# Patient Record
Sex: MALE | Race: WHITE
[De-identification: names, ages, dates, MRNs, and addresses within clinical notes are randomized per-mention and may not be internally consistent; named-entity substitution may affect disease eponyms.]

---

## 2019-02-01 ENCOUNTER — HOSPITAL ENCOUNTER (EMERGENCY)
Dept: HOSPITAL 54 - ER | Age: 69
Discharge: HOME | End: 2019-02-01
Payer: MEDICARE

## 2019-02-01 VITALS — BODY MASS INDEX: 27.74 KG/M2 | HEIGHT: 68 IN | WEIGHT: 183 LBS

## 2019-02-01 VITALS — SYSTOLIC BLOOD PRESSURE: 110 MMHG | DIASTOLIC BLOOD PRESSURE: 78 MMHG

## 2019-02-01 DIAGNOSIS — Z79.82: ICD-10-CM

## 2019-02-01 DIAGNOSIS — Z98.890: ICD-10-CM
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
## 2019-02-01 DIAGNOSIS — I25.10: ICD-10-CM

## 2019-02-01 DIAGNOSIS — Z85.46: ICD-10-CM

## 2019-02-01 DIAGNOSIS — F39: ICD-10-CM
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
## 2019-02-01 DIAGNOSIS — S22.32XA: Primary | ICD-10-CM

## 2019-02-01 PROCEDURE — 71100 X-RAY EXAM RIBS UNI 2 VIEWS: CPT

## 2019-02-01 PROCEDURE — 99283 EMERGENCY DEPT VISIT LOW MDM: CPT

## 2019-02-01 NOTE — NUR
RIB PAIN X 1WEEK; GLF X1 WEEK AGO.  STATES PAIN LEVEL 5/10, PAIN "FEELS LIKE A 
BRUISE".  PT IS AOX4, AMB, VSS, RR EVEN AND UNLABORED.  SKIN INTACT.  NO ACUTE 
DISTRESS NOTED.  DENIES SOB, DIZZINESS, WEAKNESS, N/V.  READY FOR EVAL.

## 2019-08-12 ENCOUNTER — HOSPITAL ENCOUNTER (EMERGENCY)
Dept: HOSPITAL 54 - ER | Age: 69
Discharge: HOME | End: 2019-08-12
Payer: MEDICARE

## 2019-08-12 VITALS — BODY MASS INDEX: 28.04 KG/M2 | WEIGHT: 185 LBS | HEIGHT: 68 IN

## 2019-08-12 VITALS — DIASTOLIC BLOOD PRESSURE: 68 MMHG | SYSTOLIC BLOOD PRESSURE: 128 MMHG

## 2019-08-12 DIAGNOSIS — Z85.46: ICD-10-CM

## 2019-08-12 DIAGNOSIS — Z98.890: ICD-10-CM

## 2019-08-12 DIAGNOSIS — G89.29: ICD-10-CM

## 2019-08-12 DIAGNOSIS — Z95.5: ICD-10-CM

## 2019-08-12 DIAGNOSIS — R42: ICD-10-CM

## 2019-08-12 DIAGNOSIS — F32.9: ICD-10-CM

## 2019-08-12 DIAGNOSIS — I25.2: ICD-10-CM

## 2019-08-12 DIAGNOSIS — I25.10: ICD-10-CM

## 2019-08-12 DIAGNOSIS — K52.9: Primary | ICD-10-CM

## 2019-08-12 DIAGNOSIS — M54.9: ICD-10-CM

## 2019-08-12 DIAGNOSIS — Z79.82: ICD-10-CM

## 2019-08-12 LAB
ALBUMIN SERPL BCP-MCNC: 3.6 G/DL (ref 3.4–5)
ALP SERPL-CCNC: 59 U/L (ref 46–116)
ALT SERPL W P-5'-P-CCNC: 29 U/L (ref 12–78)
AST SERPL W P-5'-P-CCNC: 16 U/L (ref 15–37)
BASOPHILS # BLD AUTO: 0 /CMM (ref 0–0.2)
BASOPHILS NFR BLD AUTO: 0.1 % (ref 0–2)
BILIRUB DIRECT SERPL-MCNC: 0.1 MG/DL (ref 0–0.2)
BILIRUB SERPL-MCNC: 0.6 MG/DL (ref 0.2–1)
BUN SERPL-MCNC: 34 MG/DL (ref 7–18)
CALCIUM SERPL-MCNC: 8.8 MG/DL (ref 8.5–10.1)
CHLORIDE SERPL-SCNC: 104 MMOL/L (ref 98–107)
CO2 SERPL-SCNC: 28 MMOL/L (ref 21–32)
CREAT SERPL-MCNC: 1.3 MG/DL (ref 0.6–1.3)
EOSINOPHIL NFR BLD AUTO: 1 % (ref 0–6)
GLUCOSE SERPL-MCNC: 119 MG/DL (ref 74–106)
HCT VFR BLD AUTO: 49 % (ref 39–51)
HGB BLD-MCNC: 16.4 G/DL (ref 13.5–17.5)
LIPASE SERPL-CCNC: 67 U/L (ref 73–393)
LYMPHOCYTES NFR BLD AUTO: 0.3 /CMM (ref 0.8–4.8)
LYMPHOCYTES NFR BLD AUTO: 5.3 % (ref 20–44)
MCHC RBC AUTO-ENTMCNC: 34 G/DL (ref 31–36)
MCV RBC AUTO: 94 FL (ref 80–96)
MONOCYTES NFR BLD AUTO: 0.2 /CMM (ref 0.1–1.3)
MONOCYTES NFR BLD AUTO: 4.1 % (ref 2–12)
NEUTROPHILS # BLD AUTO: 4.4 /CMM (ref 1.8–8.9)
NEUTROPHILS NFR BLD AUTO: 89.5 % (ref 43–81)
PH UR STRIP: 7.5 [PH] (ref 5–8)
PLATELET # BLD AUTO: 160 /CMM (ref 150–450)
POTASSIUM SERPL-SCNC: 4.1 MMOL/L (ref 3.5–5.1)
PROT SERPL-MCNC: 6.9 G/DL (ref 6.4–8.2)
RBC # BLD AUTO: 5.18 MIL/UL (ref 4.5–6)
RBC #/AREA URNS HPF: (no result) /HPF (ref 0–2)
SODIUM SERPL-SCNC: 142 MMOL/L (ref 136–145)
UROBILINOGEN UR STRIP-MCNC: 1 EU/DL
WBC #/AREA URNS HPF: (no result) /HPF (ref 0–3)
WBC NRBC COR # BLD AUTO: 4.9 K/UL (ref 4.3–11)

## 2019-08-12 PROCEDURE — 83605 ASSAY OF LACTIC ACID: CPT

## 2019-08-12 PROCEDURE — 96365 THER/PROPH/DIAG IV INF INIT: CPT

## 2019-08-12 PROCEDURE — 36415 COLL VENOUS BLD VENIPUNCTURE: CPT

## 2019-08-12 PROCEDURE — 80076 HEPATIC FUNCTION PANEL: CPT

## 2019-08-12 PROCEDURE — 85025 COMPLETE CBC W/AUTO DIFF WBC: CPT

## 2019-08-12 PROCEDURE — 96375 TX/PRO/DX INJ NEW DRUG ADDON: CPT

## 2019-08-12 PROCEDURE — 87040 BLOOD CULTURE FOR BACTERIA: CPT

## 2019-08-12 PROCEDURE — 81001 URINALYSIS AUTO W/SCOPE: CPT

## 2019-08-12 PROCEDURE — 99283 EMERGENCY DEPT VISIT LOW MDM: CPT

## 2019-08-12 PROCEDURE — 80048 BASIC METABOLIC PNL TOTAL CA: CPT

## 2019-08-12 PROCEDURE — 83690 ASSAY OF LIPASE: CPT

## 2019-08-12 PROCEDURE — 87086 URINE CULTURE/COLONY COUNT: CPT

## 2019-11-13 ENCOUNTER — HOSPITAL ENCOUNTER (EMERGENCY)
Dept: HOSPITAL 54 - ER | Age: 69
Discharge: HOME | End: 2019-11-13
Payer: MEDICARE

## 2019-11-13 VITALS — SYSTOLIC BLOOD PRESSURE: 125 MMHG | DIASTOLIC BLOOD PRESSURE: 79 MMHG

## 2019-11-13 VITALS — BODY MASS INDEX: 27.28 KG/M2 | WEIGHT: 180 LBS | HEIGHT: 68 IN

## 2019-11-13 DIAGNOSIS — I48.91: ICD-10-CM

## 2019-11-13 DIAGNOSIS — I25.10: ICD-10-CM

## 2019-11-13 DIAGNOSIS — Z79.82: ICD-10-CM

## 2019-11-13 DIAGNOSIS — G89.29: ICD-10-CM

## 2019-11-13 DIAGNOSIS — I25.2: ICD-10-CM

## 2019-11-13 DIAGNOSIS — F32.9: ICD-10-CM

## 2019-11-13 DIAGNOSIS — R07.81: ICD-10-CM

## 2019-11-13 DIAGNOSIS — Z98.890: ICD-10-CM

## 2019-11-13 DIAGNOSIS — M54.6: Primary | ICD-10-CM

## 2019-11-13 DIAGNOSIS — Z79.899: ICD-10-CM

## 2019-11-13 NOTE — NUR
Patient refused to wait and sign the discharge paperworks. Dr. Neri explained 
discharge instructions and patient verbalized understanding. Left in stable 
conditon.

## 2019-11-13 NOTE — NUR
PATIENT REFUSED X-RAY AND MEDICATION. DR. JIMÉNEZ MADE AWARE. EXPLAINED RISKS AND 
BENEFITS, STILL REFUSED, PER PATIENT HE HAS AN APPOINTMENT WITH HIS PRIMARY MD.

## 2020-10-09 ENCOUNTER — HOSPITAL ENCOUNTER (EMERGENCY)
Dept: HOSPITAL 54 - ER | Age: 70
Discharge: HOME | End: 2020-10-09
Payer: MEDICARE

## 2020-10-09 VITALS — SYSTOLIC BLOOD PRESSURE: 154 MMHG | DIASTOLIC BLOOD PRESSURE: 84 MMHG

## 2020-10-09 VITALS — WEIGHT: 165 LBS | HEIGHT: 67 IN | BODY MASS INDEX: 25.9 KG/M2

## 2020-10-09 DIAGNOSIS — K22.2: Primary | ICD-10-CM

## 2020-10-09 DIAGNOSIS — G89.29: ICD-10-CM

## 2020-10-09 DIAGNOSIS — Z85.46: ICD-10-CM

## 2020-10-09 DIAGNOSIS — R11.10: ICD-10-CM

## 2020-10-09 DIAGNOSIS — M54.9: ICD-10-CM

## 2020-10-09 DIAGNOSIS — Z95.5: ICD-10-CM

## 2020-10-09 DIAGNOSIS — I25.2: ICD-10-CM

## 2020-10-09 DIAGNOSIS — Z98.890: ICD-10-CM

## 2020-10-09 DIAGNOSIS — Z79.899: ICD-10-CM

## 2020-10-09 DIAGNOSIS — I25.10: ICD-10-CM

## 2020-10-09 DIAGNOSIS — Z79.82: ICD-10-CM

## 2020-10-09 NOTE — NUR
PT WAS GIVEN WATER PER DR. VELEZ'S ORDER, PT ABLE TO SWALLOW WATER WITHOUT 
DIFFICULTY. PT LEFT CAN'T WAIT FOR AFTER CARE INSTRUCTIONS. DR. VELEZ MADE 
AWARE.

## 2025-07-30 ENCOUNTER — HOSPITAL ENCOUNTER (INPATIENT)
Dept: HOSPITAL 54 - ER | Age: 75
LOS: 1 days | Discharge: HOME | DRG: 303 | End: 2025-07-31
Attending: NURSE PRACTITIONER | Admitting: NURSE PRACTITIONER
Payer: MEDICARE

## 2025-07-30 VITALS — SYSTOLIC BLOOD PRESSURE: 147 MMHG | OXYGEN SATURATION: 98 % | DIASTOLIC BLOOD PRESSURE: 90 MMHG | TEMPERATURE: 98.2 F

## 2025-07-30 VITALS — OXYGEN SATURATION: 98 % | DIASTOLIC BLOOD PRESSURE: 77 MMHG | SYSTOLIC BLOOD PRESSURE: 123 MMHG | TEMPERATURE: 97.7 F

## 2025-07-30 VITALS — DIASTOLIC BLOOD PRESSURE: 77 MMHG | SYSTOLIC BLOOD PRESSURE: 123 MMHG | TEMPERATURE: 97.7 F | OXYGEN SATURATION: 98 %

## 2025-07-30 VITALS — HEIGHT: 67 IN | BODY MASS INDEX: 30.45 KG/M2 | WEIGHT: 194 LBS

## 2025-07-30 VITALS — OXYGEN SATURATION: 97 % | DIASTOLIC BLOOD PRESSURE: 79 MMHG | TEMPERATURE: 98.2 F | SYSTOLIC BLOOD PRESSURE: 137 MMHG

## 2025-07-30 DIAGNOSIS — E78.5: ICD-10-CM

## 2025-07-30 DIAGNOSIS — E83.9: ICD-10-CM

## 2025-07-30 DIAGNOSIS — I25.10: Primary | ICD-10-CM

## 2025-07-30 DIAGNOSIS — Z79.82: ICD-10-CM

## 2025-07-30 DIAGNOSIS — I50.42: ICD-10-CM

## 2025-07-30 DIAGNOSIS — N18.9: ICD-10-CM

## 2025-07-30 DIAGNOSIS — I25.2: ICD-10-CM

## 2025-07-30 DIAGNOSIS — Z85.46: ICD-10-CM

## 2025-07-30 DIAGNOSIS — Z95.5: ICD-10-CM

## 2025-07-30 DIAGNOSIS — Z96.82: ICD-10-CM

## 2025-07-30 DIAGNOSIS — I48.91: ICD-10-CM

## 2025-07-30 DIAGNOSIS — Z79.01: ICD-10-CM

## 2025-07-30 DIAGNOSIS — M89.9: ICD-10-CM

## 2025-07-30 DIAGNOSIS — N17.9: ICD-10-CM

## 2025-07-30 DIAGNOSIS — D68.69: ICD-10-CM

## 2025-07-30 DIAGNOSIS — G89.4: ICD-10-CM

## 2025-07-30 LAB
ALBUMIN SERPL BCP-MCNC: 4 G/DL (ref 3.4–5)
ALP SERPL-CCNC: 79 U/L (ref 46–116)
ALT SERPL W P-5'-P-CCNC: 44 U/L (ref 12–78)
ANISOCYTOSIS BLD QL: (no result)
AST SERPL W P-5'-P-CCNC: 42 U/L (ref 15–37)
AUER BODIES BLD QL SMEAR: (no result)
BASOPHILS # BLD AUTO: 0 K/UL (ref 0–0.2)
BASOPHILS NFR BLD AUTO: 0.4 % (ref 0–2)
BILIRUB DIRECT SERPL-MCNC: 0.2 MG/DL (ref 0–0.2)
BILIRUB SERPL-MCNC: 0.8 MG/DL (ref 0.2–1)
BUN SERPL-MCNC: 20 MG/DL (ref 7–18)
CABOT RINGS BLD QL SMEAR: (no result)
CALCIUM SERPL-MCNC: 9 MG/DL (ref 8.5–10.1)
CHLORIDE SERPL-SCNC: 104 MMOL/L (ref 98–107)
CO2 SERPL-SCNC: 27 MMOL/L (ref 21–32)
CREAT SERPL-MCNC: 1.6 MG/DL (ref 0.6–1.3)
DACRYOCYTES BLD QL SMEAR: (no result)
DOHLE BOD BLD QL SMEAR: (no result)
EOSINOPHIL # BLD AUTO: 0.1 K/UL (ref 0–0.7)
EOSINOPHIL NFR BLD AUTO: 1.9 % (ref 0–6)
ERYTHROCYTE [DISTWIDTH] IN BLOOD BY AUTOMATED COUNT: 16.5 % (ref 11.5–15)
GLUCOSE SERPL-MCNC: 111 MG/DL (ref 74–106)
HCT VFR BLD AUTO: 48 % (ref 39–51)
HELMET CELLS BLD QL SMEAR: (no result)
HGB BLD-MCNC: 14.5 G/DL (ref 13.5–17.5)
HOWELL-JOLLY BOD BLD QL SMEAR: (no result)
HYPOCHROMIA BLD QL: (no result)
LYMPHOCYTES NFR BLD AUTO: 2.6 K/UL (ref 0.8–4.8)
LYMPHOCYTES NFR BLD AUTO: 35.2 % (ref 20–44)
MACROCYTES BLD QL: (no result)
MCH RBC QN AUTO: 27 PG (ref 26–33)
MCHC RBC AUTO-ENTMCNC: 30 G/DL (ref 31–36)
MCV RBC AUTO: 90 FL (ref 80–96)
MONOCYTES NFR BLD AUTO: 0.9 K/UL (ref 0.1–1.3)
MONOCYTES NFR BLD AUTO: 11.4 % (ref 2–12)
NEUTROPHILS # BLD AUTO: 3.8 K/UL (ref 1.8–8.9)
NEUTROPHILS NFR BLD AUTO: 51.1 % (ref 43–81)
OVALOCYTES BLD QL SMEAR: (no result)
PAPPENHEIMER BOD BLD QL SMEAR: (no result)
PLATELET # BLD AUTO: 218 K/UL (ref 150–450)
PLATELET BLD QL SMEAR: (no result)
POTASSIUM SERPL-SCNC: 4.6 MMOL/L (ref 3.5–5.1)
PROT SERPL-MCNC: 6.8 G/DL (ref 6.4–8.2)
RBC # BLD AUTO: 5.32 MIL/UL (ref 4.5–6)
ROULEAUX BLD QL SMEAR: (no result)
SODIUM SERPL-SCNC: 140 MMOL/L (ref 136–145)
STOMATOCYTES BLD QL SMEAR: (no result)
TARGETS BLD QL SMEAR: (no result)
TOXIC GRANULES BLD QL SMEAR: (no result)
WBC NRBC COR # BLD AUTO: 7.5 K/UL (ref 4.3–11)

## 2025-07-30 PROCEDURE — G0378 HOSPITAL OBSERVATION PER HR: HCPCS

## 2025-07-30 PROCEDURE — A4223 INFUSION SUPPLIES W/O PUMP: HCPCS

## 2025-07-30 RX ADMIN — TRAZODONE HYDROCHLORIDE SCH MG: 50 TABLET ORAL at 21:58

## 2025-07-30 RX ADMIN — Medication PRN MG: at 19:18

## 2025-07-30 RX ADMIN — APIXABAN SCH MG: 5 TABLET, FILM COATED ORAL at 11:00

## 2025-07-30 RX ADMIN — SODIUM CHLORIDE, SODIUM LACTATE, POTASSIUM CHLORIDE, AND CALCIUM CHLORIDE SCH MLS/HR: .6; .31; .03; .02 INJECTION, SOLUTION INTRAVENOUS at 15:10

## 2025-07-30 RX ADMIN — METOPROLOL TARTRATE SCH MG: 50 TABLET, FILM COATED ORAL at 17:36

## 2025-07-30 RX ADMIN — ATORVASTATIN CALCIUM SCH MG: 10 TABLET, FILM COATED ORAL at 21:58

## 2025-07-30 RX ADMIN — TAMSULOSIN HYDROCHLORIDE SCH MG: 0.4 CAPSULE ORAL at 21:58

## 2025-07-30 RX ADMIN — ENOXAPARIN SODIUM SCH MG: 80 INJECTION SUBCUTANEOUS at 15:15

## 2025-07-31 VITALS — OXYGEN SATURATION: 99 % | TEMPERATURE: 98.9 F | DIASTOLIC BLOOD PRESSURE: 93 MMHG | SYSTOLIC BLOOD PRESSURE: 154 MMHG

## 2025-07-31 VITALS — TEMPERATURE: 97.9 F | DIASTOLIC BLOOD PRESSURE: 97 MMHG | OXYGEN SATURATION: 99 % | SYSTOLIC BLOOD PRESSURE: 146 MMHG

## 2025-07-31 VITALS — OXYGEN SATURATION: 96 % | DIASTOLIC BLOOD PRESSURE: 74 MMHG | TEMPERATURE: 98.1 F | SYSTOLIC BLOOD PRESSURE: 114 MMHG

## 2025-07-31 VITALS — DIASTOLIC BLOOD PRESSURE: 74 MMHG | SYSTOLIC BLOOD PRESSURE: 114 MMHG

## 2025-07-31 LAB
ANISOCYTOSIS BLD QL: (no result)
AUER BODIES BLD QL SMEAR: (no result)
BASOPHILS # BLD AUTO: 0 K/UL (ref 0–0.2)
BASOPHILS NFR BLD AUTO: 0.6 % (ref 0–2)
BUN SERPL-MCNC: 18 MG/DL (ref 7–18)
CABOT RINGS BLD QL SMEAR: (no result)
CALCIUM SERPL-MCNC: 9.2 MG/DL (ref 8.5–10.1)
CHLORIDE SERPL-SCNC: 107 MMOL/L (ref 98–107)
CHOLEST SERPL-MCNC: 189 MG/DL (ref ?–200)
CO2 SERPL-SCNC: 26 MMOL/L (ref 21–32)
CREAT SERPL-MCNC: 1.3 MG/DL (ref 0.6–1.3)
DACRYOCYTES BLD QL SMEAR: (no result)
DOHLE BOD BLD QL SMEAR: (no result)
EOSINOPHIL # BLD AUTO: 0.1 K/UL (ref 0–0.7)
EOSINOPHIL NFR BLD AUTO: 1.7 % (ref 0–6)
ERYTHROCYTE [DISTWIDTH] IN BLOOD BY AUTOMATED COUNT: 15.7 % (ref 11.5–15)
GLUCOSE SERPL-MCNC: 87 MG/DL (ref 74–106)
HCT VFR BLD AUTO: 48 % (ref 39–51)
HDLC SERPL-MCNC: 96 MG/DL (ref 40–60)
HELMET CELLS BLD QL SMEAR: (no result)
HGB BLD-MCNC: 15.2 G/DL (ref 13.5–17.5)
HOWELL-JOLLY BOD BLD QL SMEAR: (no result)
HYPOCHROMIA BLD QL: (no result)
LDLC SERPL DIRECT ASSAY-MCNC: 70 MG/DL (ref 0–99)
LYMPHOCYTES NFR BLD AUTO: 1.2 K/UL (ref 0.8–4.8)
LYMPHOCYTES NFR BLD AUTO: 22.7 % (ref 20–44)
MACROCYTES BLD QL: (no result)
MAGNESIUM SERPL-MCNC: 2.4 MG/DL (ref 1.8–2.4)
MCH RBC QN AUTO: 28 PG (ref 26–33)
MCHC RBC AUTO-ENTMCNC: 32 G/DL (ref 31–36)
MCV RBC AUTO: 89 FL (ref 80–96)
MONOCYTES NFR BLD AUTO: 0.5 K/UL (ref 0.1–1.3)
MONOCYTES NFR BLD AUTO: 8.9 % (ref 2–12)
NEUTROPHILS # BLD AUTO: 3.5 K/UL (ref 1.8–8.9)
NEUTROPHILS NFR BLD AUTO: 66.1 % (ref 43–81)
OVALOCYTES BLD QL SMEAR: (no result)
PAPPENHEIMER BOD BLD QL SMEAR: (no result)
PHOSPHATE SERPL-MCNC: 3.2 MG/DL (ref 2.5–4.9)
PLATELET # BLD AUTO: 202 K/UL (ref 150–450)
PLATELET BLD QL SMEAR: (no result)
POTASSIUM SERPL-SCNC: 4.4 MMOL/L (ref 3.5–5.1)
RBC # BLD AUTO: 5.42 MIL/UL (ref 4.5–6)
ROULEAUX BLD QL SMEAR: (no result)
SODIUM SERPL-SCNC: 144 MMOL/L (ref 136–145)
STOMATOCYTES BLD QL SMEAR: (no result)
TARGETS BLD QL SMEAR: (no result)
TOXIC GRANULES BLD QL SMEAR: (no result)
TRIGL SERPL-MCNC: 82 MG/DL (ref 30–150)
WBC NRBC COR # BLD AUTO: 5.3 K/UL (ref 4.3–11)

## 2025-07-31 RX ADMIN — AMIODARONE HYDROCHLORIDE SCH MG: 200 TABLET ORAL at 10:00

## 2025-07-31 RX ADMIN — EMPAGLIFLOZIN SCH MG: 10 TABLET, FILM COATED ORAL at 10:01

## 2025-07-31 RX ADMIN — Medication SCH MG: at 09:59

## 2025-07-31 RX ADMIN — CLOPIDOGREL BISULFATE SCH MG: 75 TABLET, FILM COATED ORAL at 09:59

## 2025-07-31 RX ADMIN — EZETIMIBE SCH MG: 10 TABLET ORAL at 09:59

## 2025-07-31 RX ADMIN — LAMOTRIGINE SCH MG: 100 TABLET ORAL at 09:59
